# Patient Record
Sex: MALE | Race: WHITE | NOT HISPANIC OR LATINO | Employment: OTHER | URBAN - METROPOLITAN AREA
[De-identification: names, ages, dates, MRNs, and addresses within clinical notes are randomized per-mention and may not be internally consistent; named-entity substitution may affect disease eponyms.]

---

## 2018-09-29 ENCOUNTER — HOSPITAL ENCOUNTER (EMERGENCY)
Facility: HOSPITAL | Age: 83
Discharge: HOME | End: 2018-09-29
Attending: EMERGENCY MEDICINE
Payer: MEDICARE

## 2018-09-29 VITALS
SYSTOLIC BLOOD PRESSURE: 112 MMHG | RESPIRATION RATE: 23 BRPM | OXYGEN SATURATION: 95 % | HEART RATE: 72 BPM | TEMPERATURE: 98 F | WEIGHT: 280 LBS | DIASTOLIC BLOOD PRESSURE: 62 MMHG

## 2018-09-29 DIAGNOSIS — M79.89 LEG SWELLING: Primary | ICD-10-CM

## 2018-09-29 LAB
ALBUMIN SERPL BCP-MCNC: 3.2 G/DL
ALP SERPL-CCNC: 96 U/L
ALT SERPL W/O P-5'-P-CCNC: 14 U/L
ANION GAP SERPL CALC-SCNC: 11 MMOL/L
APTT BLDCRRT: 26.9 SEC
AST SERPL-CCNC: 20 U/L
BASOPHILS # BLD AUTO: 0.03 K/UL
BASOPHILS NFR BLD: 0.4 %
BILIRUB SERPL-MCNC: 0.9 MG/DL
BUN SERPL-MCNC: 41 MG/DL
CALCIUM SERPL-MCNC: 9.7 MG/DL
CHLORIDE SERPL-SCNC: 102 MMOL/L
CO2 SERPL-SCNC: 24 MMOL/L
CREAT SERPL-MCNC: 1.2 MG/DL
DIFFERENTIAL METHOD: ABNORMAL
EOSINOPHIL # BLD AUTO: 0 K/UL
EOSINOPHIL NFR BLD: 0.5 %
ERYTHROCYTE [DISTWIDTH] IN BLOOD BY AUTOMATED COUNT: 14.9 %
EST. GFR  (AFRICAN AMERICAN): >60 ML/MIN/1.73 M^2
EST. GFR  (NON AFRICAN AMERICAN): 55.6 ML/MIN/1.73 M^2
GLUCOSE SERPL-MCNC: 96 MG/DL
HCT VFR BLD AUTO: 38.2 %
HGB BLD-MCNC: 12.2 G/DL
IMM GRANULOCYTES # BLD AUTO: 0.03 K/UL
IMM GRANULOCYTES NFR BLD AUTO: 0.4 %
INR PPP: 1.2
LYMPHOCYTES # BLD AUTO: 1.1 K/UL
LYMPHOCYTES NFR BLD: 14.4 %
MCH RBC QN AUTO: 29 PG
MCHC RBC AUTO-ENTMCNC: 31.9 G/DL
MCV RBC AUTO: 91 FL
MONOCYTES # BLD AUTO: 0.9 K/UL
MONOCYTES NFR BLD: 11.3 %
NEUTROPHILS # BLD AUTO: 5.7 K/UL
NEUTROPHILS NFR BLD: 73 %
NRBC BLD-RTO: 0 /100 WBC
PLATELET # BLD AUTO: 283 K/UL
PMV BLD AUTO: 9.6 FL
POTASSIUM SERPL-SCNC: 4.2 MMOL/L
PROT SERPL-MCNC: 7.5 G/DL
PROTHROMBIN TIME: 11.9 SEC
RBC # BLD AUTO: 4.21 M/UL
SODIUM SERPL-SCNC: 137 MMOL/L
WBC # BLD AUTO: 7.79 K/UL

## 2018-09-29 PROCEDURE — 99284 EMERGENCY DEPT VISIT MOD MDM: CPT | Mod: 25

## 2018-09-29 PROCEDURE — 25000003 PHARM REV CODE 250: Performed by: PHYSICIAN ASSISTANT

## 2018-09-29 PROCEDURE — 85610 PROTHROMBIN TIME: CPT

## 2018-09-29 PROCEDURE — 85730 THROMBOPLASTIN TIME PARTIAL: CPT

## 2018-09-29 PROCEDURE — 99284 EMERGENCY DEPT VISIT MOD MDM: CPT | Mod: ,,, | Performed by: PHYSICIAN ASSISTANT

## 2018-09-29 PROCEDURE — 80053 COMPREHEN METABOLIC PANEL: CPT

## 2018-09-29 PROCEDURE — 85025 COMPLETE CBC W/AUTO DIFF WBC: CPT

## 2018-09-29 RX ORDER — CLOPIDOGREL BISULFATE 75 MG/1
75 TABLET ORAL DAILY
COMMUNITY

## 2018-09-29 RX ORDER — CARVEDILOL 6.25 MG/1
6.25 TABLET ORAL 2 TIMES DAILY WITH MEALS
COMMUNITY

## 2018-09-29 RX ORDER — SPIRONOLACTONE 25 MG/1
25 TABLET ORAL DAILY
COMMUNITY

## 2018-09-29 RX ORDER — HYDROCODONE BITARTRATE AND ACETAMINOPHEN 5; 325 MG/1; MG/1
1 TABLET ORAL
Status: COMPLETED | OUTPATIENT
Start: 2018-09-29 | End: 2018-09-29

## 2018-09-29 RX ORDER — CITALOPRAM 20 MG/1
20 TABLET, FILM COATED ORAL DAILY
COMMUNITY

## 2018-09-29 RX ORDER — PANTOPRAZOLE SODIUM 40 MG/1
40 TABLET, DELAYED RELEASE ORAL DAILY
COMMUNITY

## 2018-09-29 RX ORDER — CLINDAMYCIN HYDROCHLORIDE 300 MG/1
300 CAPSULE ORAL 3 TIMES DAILY
COMMUNITY

## 2018-09-29 RX ORDER — ALLOPURINOL 100 MG/1
100 TABLET ORAL DAILY
COMMUNITY

## 2018-09-29 RX ORDER — POTASSIUM CHLORIDE 20 MEQ/1
20 TABLET, EXTENDED RELEASE ORAL 2 TIMES DAILY
COMMUNITY

## 2018-09-29 RX ORDER — GABAPENTIN 300 MG/1
300 CAPSULE ORAL 3 TIMES DAILY
COMMUNITY

## 2018-09-29 RX ORDER — SIMVASTATIN 40 MG/1
40 TABLET, FILM COATED ORAL NIGHTLY
COMMUNITY

## 2018-09-29 RX ORDER — NYSTATIN 100000 U/G
CREAM TOPICAL 2 TIMES DAILY
COMMUNITY

## 2018-09-29 RX ORDER — LISINOPRIL 5 MG/1
5 TABLET ORAL DAILY
COMMUNITY

## 2018-09-29 RX ORDER — FUROSEMIDE 40 MG/1
40 TABLET ORAL 2 TIMES DAILY
COMMUNITY

## 2018-09-29 RX ADMIN — HYDROCODONE BITARTRATE AND ACETAMINOPHEN 1 TABLET: 5; 325 TABLET ORAL at 10:09

## 2018-09-29 RX ADMIN — HYDROCODONE BITARTRATE AND ACETAMINOPHEN 1 TABLET: 5; 325 TABLET ORAL at 08:09

## 2018-09-29 NOTE — ED TRIAGE NOTES
Patient transferred from [place] per EMS for further evaluation of venous stasis ulcerations to BLE/rule out DVT. Patient with pulse per doppler to left foot. Patient is A&Ox4 and following commands.

## 2018-09-29 NOTE — ED NOTES
Pt off loaded from EMS stretcher to  ER stretcher in ER hallway awaiting ER room placement. Side rails up x2,  bed in low position w/ brake engaged.

## 2018-09-30 NOTE — ED PROVIDER NOTES
"Encounter Date: 9/29/2018       History     Chief Complaint   Patient presents with    Transfer from [place]     to rule out DVTs; venous stasis ulcers to bilateral lower extremities     83-year-old male with hypertension, Afib on Xarelto, CHF, hyperlipidemia and venous stasis ulcers transferred from outside hospital for bilateral lower leg swelling x2 weeks.  Patient was seen today in [place] ED for lower leg swelling, pain and erythema which has been worsening over the past 2 weeks.  Patient reports pain as "constant, aching, throbbing".  Denies any palliating or provoking factors.  Patient has been seeing wound care for venous ulcers on bilateral legs.  Transfered from [place] to rule out DVT.  Patient denies purulent discharge from ulcers, fevers, shortness of breath, chest pain or leg weakness.          Review of patient's allergies indicates:  No Known Allergies  Past Medical History:   Diagnosis Date    A-fib     CHF (congestive heart failure)     Gout     Hyperlipidemia     Hypertension     Venous stasis ulcers of both lower extremities      Past Surgical History:   Procedure Laterality Date    CHOLECYSTECTOMY      JOINT REPLACEMENT       History reviewed. No pertinent family history.  Social History     Tobacco Use    Smoking status: Never Smoker    Smokeless tobacco: Never Used   Substance Use Topics    Alcohol use: No     Frequency: Never    Drug use: No     Review of Systems   Constitutional: Positive for fatigue. Negative for chills and fever.   Respiratory: Negative for cough and shortness of breath.    Cardiovascular: Positive for leg swelling. Negative for chest pain and palpitations.   Gastrointestinal: Negative for abdominal pain, anal bleeding, blood in stool, constipation, diarrhea, nausea and rectal pain.   Endocrine: Negative for polyuria.   Genitourinary: Negative for dysuria, flank pain, frequency, hematuria and urgency.   Musculoskeletal: Positive for gait problem. " Negative for arthralgias, back pain, joint swelling, myalgias and neck pain.   Skin: Positive for color change and wound.   Neurological: Negative for weakness, light-headedness, numbness and headaches.   Hematological: Does not bruise/bleed easily.       Physical Exam     Initial Vitals [09/29/18 1820]   BP Pulse Resp Temp SpO2   131/76 85 18 97.8 °F (36.6 °C) (!) 93 %      MAP       --         Vitals:    09/29/18 2246   BP: 112/62   Pulse: 72   Resp: (!) 23   Temp:        Physical Exam    Nursing note and vitals reviewed.  Constitutional: He appears well-developed and well-nourished. He is not diaphoretic. No distress.   HENT:   Head: Normocephalic and atraumatic.   Eyes: EOM are normal. Pupils are equal, round, and reactive to light.   Neck: Normal range of motion. Neck supple.   Cardiovascular: Normal rate, regular rhythm and normal heart sounds. Exam reveals no gallop and no friction rub.    No murmur heard.  Unable to palpate bilateral DP/PT pulses.  Strong biphasic Doppler signals bilaterally   Pulmonary/Chest: Breath sounds normal. No respiratory distress. He has no wheezes. He has no rhonchi. He has no rales. He exhibits no tenderness.   Abdominal: Soft. Bowel sounds are normal. He exhibits no distension and no mass. There is no tenderness. There is no rebound and no guarding.   Musculoskeletal: Normal range of motion. He exhibits edema and tenderness.        Left foot: Comments:   Left great toe amputated.  Well-healed scar without erythema, discharge or tenderness        Feet:    Bilateral lower legs edematous, tender, erythematous.  No warmth.   Neurological: He is alert and oriented to person, place, and time. He has normal strength. No sensory deficit.   Skin: Skin is warm and dry. There is erythema.          Bilateral lower legs erythematous  And swollen.  Equal in size bilaterally.  Multiple large ulcers  with thick eschar noted on lower legs, worse on left side.  No bleeding, discharge or warmth.    Psychiatric: He has a normal mood and affect.         ED Course   Procedures  Labs Reviewed   CBC W/ AUTO DIFFERENTIAL - Abnormal; Notable for the following components:       Result Value    RBC 4.21 (*)     Hemoglobin 12.2 (*)     Hematocrit 38.2 (*)     MCHC 31.9 (*)     RDW 14.9 (*)     Lymph% 14.4 (*)     All other components within normal limits   COMPREHENSIVE METABOLIC PANEL - Abnormal; Notable for the following components:    BUN, Bld 41 (*)     Albumin 3.2 (*)     eGFR if non  55.6 (*)     All other components within normal limits   APTT   PROTIME-INR          Imaging Results          US Lower Extremity Veins Bilateral (Final result)  Result time 09/29/18 21:31:26    Final result by Terry Epstein MD (09/29/18 21:31:26)                 Impression:      No evidence of deep venous thrombosis in either lower extremity.    Electronically signed by resident: Tyshawn Castaneda  Date:    09/29/2018  Time:    21:27    Electronically signed by: Terry Epstein MD  Date:    09/29/2018  Time:    21:31             Narrative:    EXAMINATION:  US LOWER EXTREMITY VEINS BILATERAL    CLINICAL HISTORY:  Other specified soft tissue disorders    TECHNIQUE:  Duplex and color flow Doppler and dynamic compression was performed of the bilateral lower extremity veins was performed.    COMPARISON:  None.    FINDINGS:  Right thigh veins: The common femoral, femoral, popliteal, upper greater saphenous, and deep femoral veins are patent and free of thrombus. The veins are normally compressible and have normal phasic flow and augmentation response.    Right calf veins: The visualized calf veins are patent.    Left thigh veins: The common femoral, femoral, popliteal, upper greater saphenous, and deep femoral veins are patent and free of thrombus. The veins are normally compressible and have normal phasic flow and augmentation response.    Left calf veins: The visualized calf veins are patent.    Miscellaneous: None.                                  Medical Decision Making:   History:   Old Medical Records: I decided to obtain old medical records.  Clinical Tests:   Lab Tests: Ordered and Reviewed  Radiological Study: Ordered and Reviewed  Other:   I have discussed this case with another health care provider.       APC / Resident Notes:    83-year-old male transferred from [facility] to rule out DVT.  Patient has had bilateral lower leg swelling x2 weeks with pain and tenderness.   No shortness of breath or chest pain to suggest PE.  Patient on Xarelto. On exam, vitals are stable,   Bilateral lower legs appear erythematous, edematous and tender to touch.  Multiple, large venous stasis ulcers present on bilateral legs, worse on the left side.  No active bleeding or purulent discharge.  No warmth or obvious cellulitis.  Unable to palpate pulses secondary to edema,  However strong biphasic signals on bilateral DP/PT with Doppler. DDx includes  chronic venous stasis, cellulitis, DVT.  Will check basic labs,  coags and do ultrasound lower extremities to evaluate for DVT.      Labs unremarkable.  No leukocytosis or granulocytosis suggest  Bacterial infection.  Ultrasound without evidence of DVT.  Given negative workup, I do not believe he requires further ED treatment and is stable for discharge.  Will dress wounds on legs prior to discharge.  Stressed the importance of follow-up, especially with outpatient wound care.  Strict ED return precautions given.  Patient and granddaughter voiced understanding and are comfortable with discharge.  I discussed this patient with my supervising physician.    Juliet Kauffman PA-C                   Clinical Impression:   The encounter diagnosis was Leg swelling.      Disposition:   Disposition: Discharged  Condition: Stable                        Juliet Kauffman PA-C  09/30/18 [id number]

## 2018-09-30 NOTE — ED NOTES
LOC: The patient is awake, alert and aware of environment with an appropriate affect, the patient is oriented x 3 and speaking appropriately. PERRLA present.Symmetrical facial expression present. Patient with equal hand grasps bilaterally.   APPEARANCE: Patient resting comfortably and in no acute distress, patient is clean and well groomed, patient's clothing is properly fastened.  SKIN: The skin is warm and dry, patient has normal skin turgor and moist mucus membranes. Patient with venous stasis ulcerations present to patient BLE; skin is red and elena; ulcerations are oozing purulent drainage and malodorous.   MUSCULOSKELETAL: Patient moving all extremities well x 2.Patient with limited ROM to BLE.Patient reports difficulty with ambulation. Patient reports intermittent numbness and tingling to BLE.  RESPIRATORY: Airway is open and patent, respirations are spontaneous, patient has a normal effort and rate. Breath sounds are clear and equal bilaterally. Denies cough.  CARDIAC: Normal heart sounds. Patient with +4 pitting edema to BLE. Denies chest pain.   ABDOMEN: Soft and non tender to palpation, no distention noted. Bowel sounds present. Patient denies N/V/D.

## 2018-09-30 NOTE — ED NOTES
All discharge instructions reviewed with patient and questions addressed. VSS, NAD noted, and patient A&Ox4. Patient to car in wheelchair per RN. Patient grand daughter remains at patient side. All belongings with patient at time of departure.